# Patient Record
Sex: FEMALE | Race: WHITE
[De-identification: names, ages, dates, MRNs, and addresses within clinical notes are randomized per-mention and may not be internally consistent; named-entity substitution may affect disease eponyms.]

---

## 2017-04-03 ENCOUNTER — HOSPITAL ENCOUNTER (EMERGENCY)
Dept: HOSPITAL 17 - NED | Age: 52
Discharge: LEFT BEFORE DISCHARGE | End: 2017-04-03
Payer: COMMERCIAL

## 2017-04-03 VITALS
TEMPERATURE: 98.2 F | DIASTOLIC BLOOD PRESSURE: 102 MMHG | HEART RATE: 96 BPM | RESPIRATION RATE: 20 BRPM | OXYGEN SATURATION: 98 % | SYSTOLIC BLOOD PRESSURE: 189 MMHG

## 2017-04-03 VITALS — HEIGHT: 65 IN | WEIGHT: 199.52 LBS | BODY MASS INDEX: 33.24 KG/M2

## 2017-04-03 DIAGNOSIS — M54.9: ICD-10-CM
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
## 2017-04-03 DIAGNOSIS — Z53.21: Primary | ICD-10-CM

## 2017-04-03 PROCEDURE — 99282 EMERGENCY DEPT VISIT SF MDM: CPT
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
## 2017-04-04 ENCOUNTER — HOSPITAL ENCOUNTER (EMERGENCY)
Dept: HOSPITAL 17 - PHEFT | Age: 52
Discharge: HOME | End: 2017-04-04
Payer: COMMERCIAL

## 2017-04-04 VITALS
SYSTOLIC BLOOD PRESSURE: 157 MMHG | HEART RATE: 98 BPM | TEMPERATURE: 98.7 F | OXYGEN SATURATION: 96 % | RESPIRATION RATE: 16 BRPM | DIASTOLIC BLOOD PRESSURE: 102 MMHG

## 2017-04-04 VITALS — HEIGHT: 65 IN | WEIGHT: 198.42 LBS | BODY MASS INDEX: 33.06 KG/M2
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
## 2017-04-04 DIAGNOSIS — J30.2: ICD-10-CM

## 2017-04-04 DIAGNOSIS — K21.9: ICD-10-CM

## 2017-04-04 DIAGNOSIS — E78.00: ICD-10-CM

## 2017-04-04 DIAGNOSIS — R07.81: ICD-10-CM

## 2017-04-04 DIAGNOSIS — I10: ICD-10-CM

## 2017-04-04 DIAGNOSIS — R09.81: ICD-10-CM

## 2017-04-04 DIAGNOSIS — M54.2: Primary | ICD-10-CM

## 2017-04-04 DIAGNOSIS — R06.7: ICD-10-CM

## 2017-04-04 DIAGNOSIS — R05: ICD-10-CM

## 2017-04-04 PROCEDURE — L0150 CERV SEMI-RIG ADJ MOLDED CHN: HCPCS

## 2017-04-04 PROCEDURE — 99283 EMERGENCY DEPT VISIT LOW MDM: CPT
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
## 2018-04-04 ENCOUNTER — HOSPITAL ENCOUNTER (EMERGENCY)
Dept: HOSPITAL 17 - PHED | Age: 53
Discharge: HOME | End: 2018-04-04
Payer: COMMERCIAL

## 2018-04-04 VITALS — HEIGHT: 65 IN | BODY MASS INDEX: 32.54 KG/M2 | WEIGHT: 195.33 LBS

## 2018-04-04 VITALS
OXYGEN SATURATION: 97 % | RESPIRATION RATE: 16 BRPM | SYSTOLIC BLOOD PRESSURE: 144 MMHG | HEART RATE: 76 BPM | DIASTOLIC BLOOD PRESSURE: 96 MMHG

## 2018-04-04 VITALS
HEART RATE: 102 BPM | OXYGEN SATURATION: 95 % | DIASTOLIC BLOOD PRESSURE: 96 MMHG | RESPIRATION RATE: 14 BRPM | TEMPERATURE: 98.7 F | SYSTOLIC BLOOD PRESSURE: 147 MMHG

## 2018-04-04 VITALS
OXYGEN SATURATION: 94 % | RESPIRATION RATE: 16 BRPM | SYSTOLIC BLOOD PRESSURE: 146 MMHG | DIASTOLIC BLOOD PRESSURE: 83 MMHG | HEART RATE: 84 BPM

## 2018-04-04 DIAGNOSIS — R19.7: ICD-10-CM

## 2018-04-04 DIAGNOSIS — K21.9: ICD-10-CM

## 2018-04-04 DIAGNOSIS — I10: ICD-10-CM

## 2018-04-04 DIAGNOSIS — Z87.891: ICD-10-CM

## 2018-04-04 DIAGNOSIS — R31.9: ICD-10-CM

## 2018-04-04 DIAGNOSIS — M62.81: ICD-10-CM

## 2018-04-04 DIAGNOSIS — M41.9: ICD-10-CM

## 2018-04-04 DIAGNOSIS — M54.9: Primary | ICD-10-CM

## 2018-04-04 DIAGNOSIS — E78.00: ICD-10-CM

## 2018-04-04 LAB
BASOPHILS # BLD AUTO: 0 TH/MM3 (ref 0–0.2)
BASOPHILS NFR BLD: 0.6 % (ref 0–2)
BUN SERPL-MCNC: 12 MG/DL (ref 7–18)
CALCIUM SERPL-MCNC: 9.7 MG/DL (ref 8.5–10.1)
CHLORIDE SERPL-SCNC: 102 MEQ/L (ref 98–107)
COLOR UR: YELLOW
CREAT SERPL-MCNC: 0.69 MG/DL (ref 0.5–1)
EOSINOPHIL # BLD: 0.1 TH/MM3 (ref 0–0.4)
EOSINOPHIL NFR BLD: 1.8 % (ref 0–4)
ERYTHROCYTE [DISTWIDTH] IN BLOOD BY AUTOMATED COUNT: 13.9 % (ref 11.6–17.2)
GFR SERPLBLD BASED ON 1.73 SQ M-ARVRAT: 89 ML/MIN (ref 89–?)
GLUCOSE SERPL-MCNC: 105 MG/DL (ref 74–106)
GLUCOSE UR STRIP-MCNC: (no result) MG/DL
HCO3 BLD-SCNC: 26.9 MEQ/L (ref 21–32)
HCT VFR BLD CALC: 44.2 % (ref 35–46)
HGB BLD-MCNC: 14.8 GM/DL (ref 11.6–15.3)
HGB UR QL STRIP: (no result)
KETONES UR STRIP-MCNC: (no result) MG/DL
LEUKOCYTE ESTERASE UR QL STRIP: (no result) /HPF (ref 0–5)
LYMPHOCYTES # BLD AUTO: 1.9 TH/MM3 (ref 1–4.8)
LYMPHOCYTES NFR BLD AUTO: 25.8 % (ref 9–44)
MCH RBC QN AUTO: 31.9 PG (ref 27–34)
MCHC RBC AUTO-ENTMCNC: 33.6 % (ref 32–36)
MCV RBC AUTO: 95 FL (ref 80–100)
MONOCYTE #: 0.5 TH/MM3 (ref 0–0.9)
MONOCYTES NFR BLD: 6.3 % (ref 0–8)
MUCOUS THREADS #/AREA URNS LPF: (no result) /LPF
NEUTROPHILS # BLD AUTO: 4.9 TH/MM3 (ref 1.8–7.7)
NEUTROPHILS NFR BLD AUTO: 65.5 % (ref 16–70)
NITRITE UR QL STRIP: (no result)
PLATELET # BLD: 315 TH/MM3 (ref 150–450)
PMV BLD AUTO: 7.3 FL (ref 7–11)
RBC # BLD AUTO: 4.65 MIL/MM3 (ref 4–5.3)
RBC #/AREA URNS HPF: (no result) /HPF (ref 0–3)
SODIUM SERPL-SCNC: 137 MEQ/L (ref 136–145)
SP GR UR STRIP: 1.02 (ref 1–1.03)
SQUAMOUS #/AREA URNS HPF: (no result) /HPF (ref 0–5)
URINE LEUKOCYTE ESTERASE: (no result)
WBC # BLD AUTO: 7.4 TH/MM3 (ref 4–11)

## 2018-04-04 PROCEDURE — 74176 CT ABD & PELVIS W/O CONTRAST: CPT

## 2018-04-04 PROCEDURE — 84703 CHORIONIC GONADOTROPIN ASSAY: CPT

## 2018-04-04 PROCEDURE — 96361 HYDRATE IV INFUSION ADD-ON: CPT

## 2018-04-04 PROCEDURE — 80048 BASIC METABOLIC PNL TOTAL CA: CPT

## 2018-04-04 PROCEDURE — 96374 THER/PROPH/DIAG INJ IV PUSH: CPT

## 2018-04-04 PROCEDURE — 96375 TX/PRO/DX INJ NEW DRUG ADDON: CPT

## 2018-04-04 PROCEDURE — 99284 EMERGENCY DEPT VISIT MOD MDM: CPT

## 2018-04-04 PROCEDURE — 81001 URINALYSIS AUTO W/SCOPE: CPT

## 2018-04-04 PROCEDURE — 85025 COMPLETE CBC W/AUTO DIFF WBC: CPT

## 2018-04-04 NOTE — RADRPT
EXAM DATE/TIME:  04/04/2018 08:57 

 

[facility] COMPARISON:     

No previous studies available for comparison.

 

 

INDICATIONS :     

Right flank and low back pain.  Evaluate for renal stone. 

                  

 

ORAL CONTRAST:      

No oral contrast ingested.

                  

 

RADIATION DOSE:     

22.78 CTDIvol (mGy) 

 

 

MEDICAL HISTORY :     

Gastroesophageal reflux disease. Hypertension. 

 

SURGICAL HISTORY :      

Tubal ligation. 

 

ENCOUNTER:      

Initial

 

ACUITY:      

4 - 6 days

 

PAIN SCALE:      

8/10

 

LOCATION:       

Right flank 

 

TECHNIQUE:     

Volumetric scanning of the abdomen and pelvis was performed.  Using automated exposure control and ad
justment of the mA and/or kV according to patient size, radiation dose was kept as low as reasonably 
achievable to obtain optimal diagnostic quality images.  DICOM format image data is available electro
nically for review and comparison.  

 

FINDINGS:     

 

LOWER LUNGS:     

The visualized lower lungs are clear.

 

LIVER:     

Homogeneous density without lesion.  There is no dilation of the biliary tree.  No calcified gallston
es.

 

SPLEEN:     

Normal size without lesion.

 

PANCREAS:     

Within normal limits. 

 

KIDNEYS:     

Normal in size and shape.  There is no mass, stone, or hydronephrosis.

 

ADRENAL GLANDS:     

Within normal limits.

 

VASCULAR:     

There is no aortic aneurysm.

 

BOWEL/MESENTERY:     

The stomach, small bowel, and colon demonstrate no acute abnormality.  There is no free intraperitone
al air or fluid. Appendix is identified and is radiographically normal. 

 

ABDOMINAL WALL:     

Within normal limits.

 

RETROPERITONEUM:     

There is no lymphadenopathy.

 

BLADDER:     

No wall thickening or mass.

 

REPRODUCTIVE:     

Within normal limits.

 

INGUINAL:     

There is no lymphadenopathy or hernia.

 

MUSCULOSKELETAL:     

Mild levoscoliosis of the lumbar spine. Otherwise intact

 

CONCLUSION:     

1. No acute intraperitoneal or pelvic process to explain current clinical symptoms. Specifically, no 
renal or ureteral calculi. Appendix is radiographically normal.

2. Mild levoscoliosis of the lumbar spine. Osseous structures are otherwise intact.

 

 

 

 Vargas Zazueta MD on April 04, 2018 at 10:07           

Board Certified Radiologist.

 This report was verified electronically.

## 2018-04-04 NOTE — PD
HPI


Chief Complaint:  Back/ Neck Pain or Injury


Time Seen by Provider:  08:15


Travel History


International Travel<30 days:  No


Contact w/Intl Traveler<30days:  No


Traveled to known affect area:  No





History of Present Illness


HPI


This 53-year-old female is complaining of low back pain.  She says the pain 

started Saturday quite abruptly.  Been fairly persistent since then.  Yesterday 

seemed to move into the right flank area she thought it seemed to be getting 

better but today it is again quite severe and involving both sides.  She has 

not had any dysuria.  She does not recall any injury.  She has been having some 

diarrhea up until today and her doctor diagnosed with C. difficile and gave her 

prescription for vancomycin but she has not started that yet.  She does not 

have any numbness or tingling in her legs.  She feels weak all over but weak in 

the legs





PFSH


Past Medical History


Cardiovascular Problems:  Yes (HTN)


High Cholesterol:  Yes


Diminished Hearing:  No


Gastrointestinal Disorders:  Yes (DX WITH C-DIFF)


GERD:  Yes


Hypertension:  Yes


Influenza Vaccination:  No


Pregnant?:  Not Pregnant


Tubal Ligation:  Yes





Social History


Alcohol Use:  Yes (Daily - Heavy)


Tobacco Use:  No (QUIT)


Substance Use:  No





Allergies-Medications


(Allergen,Severity, Reaction):  


Coded Allergies:  


     No Known Allergies (Verified , 4/4/17)


Reported Meds & Prescriptions





Reported Meds & Active Scripts


Active


Active Prescriptions or Reported Medications Unobtainable    








Review of Systems


General / Constitutional:  No: Fever, Chills


Eyes:  No: Diploplia


HENT:  No: Headaches, Vertigo


Cardiovascular:  No: Chest Pain or Discomfort, Palpitations


Respiratory:  No: Cough, Shortness of Breath


Gastrointestinal:  Positive: Diarrhea, No: Vomiting


Genitourinary:  No: Urgency, Frequency


Musculoskeletal:  Positive: Myalgias, Pain


Skin:  No Rash


Neurologic:  No: Weakness


Hematologic/Lymphatic:  No: Easy Bruising





Physical Exam


Narrative


GENERAL: Well-developed female.  She is uncomfortable with pain and is pacing


SKIN: Focused skin assessment warm/dry.


HEAD: Atraumatic. Normocephalic. 


EYES: Pupils equal and round. No scleral icterus. No injection or drainage. 


ENT: No nasal bleeding or discharge.  Mucous membranes pink and moist.


NECK: Trachea midline. No JVD. 


CARDIOVASCULAR: Regular rate and rhythm.  No murmur appreciated.


RESPIRATORY: No accessory muscle use. Clear to auscultation. Breath sounds 

equal bilaterally. 


GASTROINTESTINAL: Abdomen soft, non-tender, nondistended. Hepatic and splenic 

margins not palpable. 


MUSCULOSKELETAL: No obvious deformities. No clubbing.  No cyanosis.  No edema.  

There is some tenderness of the lower back.  Sensation of the legs strength is 

intact


NEUROLOGICAL: Awake and alert. No obvious cranial nerve deficits.  Motor 

grossly within normal limits. Normal speech.


PSYCHIATRIC: Appropriate mood and affect; insight and judgment normal.





Data


Data


Last Documented VS





Vital Signs








  Date Time  Temp Pulse Resp B/P (MAP) Pulse Ox O2 Delivery O2 Flow Rate FiO2


 


4/4/18 08:55  84 16 146/83 (104) 94 Room Air  


 


4/4/18 06:32 98.7       








Orders





 Orders


Urinalysis - C+S If Indicated (4/4/18 07:33)


Ed Urine Pregnancytest Poc (4/4/18 07:40)


Complete Blood Count With Diff (4/4/18 08:20)


Basic Metabolic Panel (Bmp) (4/4/18 08:20)


Sodium Chlor 0.9% 1000 Ml Inj (Ns 1000 M (4/4/18 08:30)


Ondansetron Inj (Zofran Inj) (4/4/18 08:30)


Hydromorphone Pf Inj (Dilaudid Pf Inj) (4/4/18 08:30)


Ct Abd/Pel W/O Iv Contrast (4/4/18 08:25)





Labs





Laboratory Tests








Test


  4/4/18


07:36 4/4/18


08:50


 


Urine Collection Type CLEAN CATCH  


 


Urine Color YELLOW  


 


Urine Turbidity CLEAR  


 


Urine pH 5.5  


 


Urine Specific Gravity 1.025  


 


Urine Protein TRACE mg/dL  


 


Urine Glucose (UA) NEG mg/dL  


 


Urine Ketones TRACE mg/dL  


 


Urine Occult Blood MOD  


 


Urine Nitrite NEG  


 


Urine Bilirubin NEG  


 


Urine Urobilinogen 0.2 MG/DL  


 


Urine Leukocyte Esterase NEG  


 


Urine RBC 15-19 /hpf  


 


Urine WBC 0-2 /hpf  


 


Urine Squamous Epithelial


Cells 6-8 /hpf 


  


 


 


Urine Mucus FEW /lpf  


 


Microscopic Urinalysis Comment


  CULT NOT


INDICATED 


 


 


Urine Collection Time 07:36  


 


White Blood Count  7.4 TH/MM3 


 


Red Blood Count  4.65 MIL/MM3 


 


Hemoglobin  14.8 GM/DL 


 


Hematocrit  44.2 % 


 


Mean Corpuscular Volume  95.0 FL 


 


Mean Corpuscular Hemoglobin  31.9 PG 


 


Mean Corpuscular Hemoglobin


Concent 


  33.6 % 


 


 


Red Cell Distribution Width  13.9 % 


 


Platelet Count  315 TH/MM3 


 


Mean Platelet Volume  7.3 FL 


 


Neutrophils (%) (Auto)  65.5 % 


 


Lymphocytes (%) (Auto)  25.8 % 


 


Monocytes (%) (Auto)  6.3 % 


 


Eosinophils (%) (Auto)  1.8 % 


 


Basophils (%) (Auto)  0.6 % 


 


Neutrophils # (Auto)  4.9 TH/MM3 


 


Lymphocytes # (Auto)  1.9 TH/MM3 


 


Monocytes # (Auto)  0.5 TH/MM3 


 


Eosinophils # (Auto)  0.1 TH/MM3 


 


Basophils # (Auto)  0.0 TH/MM3 


 


CBC Comment  DIFF FINAL 


 


Differential Comment   


 


Blood Urea Nitrogen  12 MG/DL 


 


Creatinine  0.69 MG/DL 


 


Random Glucose  105 MG/DL 


 


Calcium Level  9.7 MG/DL 


 


Sodium Level  137 MEQ/L 


 


Potassium Level  3.5 MEQ/L 


 


Chloride Level  102 MEQ/L 


 


Carbon Dioxide Level  26.9 MEQ/L 


 


Anion Gap  8 MEQ/L 


 


Estimat Glomerular Filtration


Rate 


  89 ML/MIN 


 











MDM


Medical Decision Making


Medical Screen Exam Complete:  Yes


Emergency Medical Condition:  Yes


Medical Record Reviewed:  Yes


Differential Diagnosis


Differential includes musculoskeletal back pain, renal colic


Narrative Course


Urine does show some hematuria so I have ordered a CT to assess for possible 

renal colic.  Patient does say that yesterday the pain seems localized in the 

right flank area.  Urinalysis did show some red cells.  CT scan is negative.  

There is mild levoscoliosis of the lumbar spine.  This appears to be 

musculoskeletal back pain





Diagnosis





 Primary Impression:  


 Back pain


Scripts


Hydrocodone-Acetaminophen (Norco) 7.5-325 mg Tab


1 TAB PO Q4H Y for PAIN, #12 TAB 0 Refills


   Prov: Devante Murphy MD         4/4/18


Disposition:  01 DISCHARGE HOME


Condition:  Stable











Devante Murphy MD Apr 4, 2018 08:24